# Patient Record
Sex: FEMALE | Race: BLACK OR AFRICAN AMERICAN | Employment: OTHER | URBAN - METROPOLITAN AREA
[De-identification: names, ages, dates, MRNs, and addresses within clinical notes are randomized per-mention and may not be internally consistent; named-entity substitution may affect disease eponyms.]

---

## 2017-03-22 ENCOUNTER — OFFICE VISIT (OUTPATIENT)
Dept: ORTHOPEDIC SURGERY | Age: 57
End: 2017-03-22

## 2017-03-22 VITALS
BODY MASS INDEX: 25.91 KG/M2 | TEMPERATURE: 98.7 F | OXYGEN SATURATION: 98 % | DIASTOLIC BLOOD PRESSURE: 67 MMHG | HEART RATE: 71 BPM | SYSTOLIC BLOOD PRESSURE: 136 MMHG | WEIGHT: 171 LBS | RESPIRATION RATE: 18 BRPM | HEIGHT: 68 IN

## 2017-03-22 DIAGNOSIS — M48.061 LUMBAR SPINAL STENOSIS: Primary | ICD-10-CM

## 2017-03-22 DIAGNOSIS — G35 MULTIPLE SCLEROSIS (HCC): ICD-10-CM

## 2017-03-22 RX ORDER — DULOXETIN HYDROCHLORIDE 30 MG/1
30 CAPSULE, DELAYED RELEASE ORAL
Qty: 30 CAP | Refills: 1 | Status: SHIPPED | OUTPATIENT
Start: 2017-03-22 | End: 2017-05-10

## 2017-03-22 RX ORDER — TERBINAFINE HYDROCHLORIDE 250 MG/1
250 TABLET ORAL
COMMUNITY
Start: 2016-09-19 | End: 2017-05-10

## 2017-03-22 NOTE — PROGRESS NOTES
[address]  [address], [address]  [facility], [address]  Phone: [phone]  Fax: [phone]        Magali Mayorga  : 1960  PCP: Kimberly Polk MD      NEW PATIENT      ASSESSMENT AND PLAN     Abby Crystal was seen today for back pain. Diagnoses and all orders for this visit:    Lumbar spinal stenosis    Multiple sclerosis ([address]    Other orders  -     DULoxetine (CYMBALTA) 30 mg capsule; Take 1 Cap by mouth daily (with dinner). 1. Injections, medications, and surgery discussed as possible treatment options. 2. Trial of Cymbalta. 3. Referral to Dr. Linus Garcia for surgical evaluation. 4. Given surgical information for stenosis. Follow-up Disposition:  Return for sx eval for stenosis . Eva Serrato is seen today in consultation at the request of Dr. Dileep Alonso for complaints of back pain and sciatica. HISTORY OF PRESENT ILLNESS  Sheldon Carrizales is a 61 y.o. female. Pt states that she has had her back pain before she was diagnosed with MS in . Pt denies any specific incident or injury that caused their pain. Over the years her pain started to increase and was happening more often. She has had intermittent RT sciatica but has now become constant. Pt notes that her back pain has become constant as well. Pt has difficulty with standing erect. She has consulted with a chiropractor that increased her pain. Pt moved to this area in , was living in [place], [place]. Pt notes that she will have trouble standing very long. She has just started using a walker. If she sits or lays down, her pain is relieved. Pt notes that she has been unable to stand erect for a few years now due to her pain level. She has been having back pain even before  when she moved. Pt states that she has a very short standing and walking tolerance. Her pain does not radiate past her ankle.  She is unable to go shopping with her mother. Pt wishes to be able to walk around like everyone else. Pt states that she is unable to do what she wishes due to her pain. Pt wishes to be able to walk and have her life back. No weakness in BLE. No saddle paresthesia. She has had injections in her spine, last done in 2008 with benefit. Pt has been on medications but is tired of taking routine medications for her pain. She wishes to have injections in her back. Pt aware that she may need surgery. Does not recall taking Cymbalta or Lyrica. Denies persistent fevers, chills, weight changes, neurogenic bowel or bladder symptoms. Pt denies recent ED visits or hospitalizations. PMHx of MS, has monthly infusions. Memory issues. Pain Assessment  3/22/2017   Location of Pain Back   Location Modifiers -   Severity of Pain 10   Quality of Pain Sharp;Dull;Aching   Duration of Pain Persistent   Frequency of Pain Constant   Aggravating Factors Bending;Stretching;Straightening;Exercise;Squatting;Kneeling;Standing;Walking;Stairs   Limiting Behavior Yes   Relieving Factors Rest   Result of Injury No             MRI Results (most recent):    Results from Hospital Encounter encounter on 02/17/17   MRI LUMB SPINE WO CONT   Narrative INDICATION: LUMBAGO WITH SCIATICA RIGHT SIDE back pain, right-sided sciatica,  radiculopathy    TECHNIQUE: Multiplanar, multisequence MRI lumbar spine without IV contrast    COMPARISON: 10/16/2008    FINDINGS:    Vertebrae: Grade 1 degenerative retrolisthesis at L2-L3. Grade 1 degenerative  anterolisthesis at L4-L5. Disc Spaces: No significant disc space narrowing. Conus: Terminates at L1    Soft Tissues: Unremarkable. LEVELS:    T12-L1: Unremarkable. L1-L2: Mild facet arthropathy. No stenosis. No change. L2-L3: Diffuse disc bulge. Superimposed left paracentral disc protrusion. Mild  central canal stenosis. Mild bilateral foraminal stenosis. This has slightly  progressed since previous study. L3-L4: Mild disc bulge.  Mild ligamentum flavum thickening and facet arthropathy. No stenosis. No change. L4-L5: Diffuse disc bulge with osteophytic ridging. Severe ligament flavum  thickening. Severe bilateral facet arthropathy. Severe central canal stenosis  with AP central canal diameter measuring 6-7 mm. This is slightly progressed  since previous study. Moderate bilateral foraminal stenosis. L5-S1: Diffuse disc bulge. Severe facet arthropathy. Mild ligament flavum  thickening. Moderate central canal stenosis. There is superimposed left  paracentral/foraminal disc protrusion resulting in severe left foraminal  stenosis. Moderate right foraminal stenosis. Degree of central canal and  foraminal stenosis has progressed since previous study. Impression IMPRESSION:  1. Severe central canal stenosis at L4-L5. 2.  Moderate central canal stenosis at L5-S1.    3.  Mild central canal stenosis at L2-L3. 4.  Multilevel degenerative changes as described above. PAST MEDICAL HISTORY   Past Medical History:   Diagnosis Date    Anemia     Anemia NEC     Arthritis     Asthma     Back pain     [facility] (multiple sclerosis) [facility] 6/2/2010    Multiple sclerosis ([facility] [address] [facility] 1993    Neurogenic bladder     Urge incontinence        Past Surgical History:   Procedure Laterality Date    APPENDECTOMY      BREAST SURGERY PROCEDURE UNLISTED      biopsy    DENTAL SURGERY PROCEDURE      HX HEENT      dental    HX OTHER SURGICAL      ORAL MAXILLOFACIAL SURGERY POST PROCEDURE ORDERSET 577       MEDICATIONS    Current Outpatient Prescriptions   Medication Sig Dispense Refill    terbinafine HCl (LAMISIL) 250 mg tablet 250 mg.      natalizumab (TYSABRI) 300 mg/15 mL injection Inject  in vein Every 28 Days.       terbinafine HCl (LAMISIL) 250 mg tablet 250 mg.      meclizine (ANTIVERT) 25 mg tablet TAKE 1 TABLET BY MOUTH AS NEEDED ONCE A DAY  0    oxyCODONE-acetaminophen (PERCOCET) 5-325 mg per tablet Take 2 Tabs by mouth every six (6) hours as needed. Max Daily Amount: 8 Tabs. 30 Tab 0    ondansetron (ZOFRAN ODT) 4 mg disintegrating tablet Take 1 Tab by mouth every eight (8) hours as needed for Nausea. 15 Tab 0    MULTIVITAMINS (MULTI-VITAMIN PO) Take  by mouth.  cholecalciferol, vitamin d3, (VITAMIN D) 1,000 unit tablet Take  by mouth daily.  ascorbic acid (VITAMIN C) 500 mg tablet Take  by mouth.  cyanocobalamin (VITAMIN B-12) 1,000 mcg tablet Take 1,000 mcg by mouth daily. ALLERGIES  Allergies   Allergen Reactions    Sulfa (Sulfonamide Antibiotics) Rash          SOCIAL HISTORY    Social History     Social History    Marital status: SINGLE     Spouse name: N/A    Number of children: N/A    Years of education: N/A     Occupational History    Not on file. Social History Main Topics    Smoking status: Never Smoker    Smokeless tobacco: Not on file    Alcohol use No    Drug use: No    Sexual activity: Yes     Other Topics Concern    Not on file     Social History Narrative       FAMILY HISTORY  Family History   Problem Relation Age of Onset    Hypertension Mother     High Cholesterol Mother     Elevated Lipids Mother     Hypertension Father     High Cholesterol Father     Elevated Lipids Father          REVIEW OF SYSTEMS  Review of Systems   Constitutional: Negative for chills, fever and weight loss. Respiratory: Negative for shortness of breath. Cardiovascular: Negative for chest pain. Gastrointestinal: Negative for constipation. Negative for fecal incontinence   Genitourinary: Negative for dysuria. Negative for urinary incontinence   Musculoskeletal:        Per HPI   Skin: Negative for rash. Neurological: Negative for dizziness, tingling, tremors, focal weakness and headaches. Endo/Heme/Allergies: Does not bruise/bleed easily. Psychiatric/Behavioral: The patient does not have insomnia.           PHYSICAL EXAMINATION  Visit Vitals    /67    Pulse 71    Temp 98.7 °F (37.1 °C) (Oral)    Resp 18    Ht 5' 8\" (1.727 m)    Wt 171 lb (77.6 kg)    SpO2 98%    BMI 26 kg/m2          Accompanied by family member. Constitutional:  Well developed, well nourished, in no acute distress. Psychiatric: Affect and mood are appropriate. Integumentary: No rashes or abrasions noted on exposed areas. Cardiovascular/Peripheral Vascular: Intact l pulses. No peripheral edema is noted. Lymphatic:  No evidence of lymphedema. No cervical lymphadenopathy. SPINE/MUSCULOSKELETAL EXAM    Lumbar spine:  No rash, ecchymosis, or gross obliquity. No fasciculations. No focal atrophy is noted. No pain with hip ROM. Tenderness to palpation bilaterally at L4-5. No tenderness to palpation at the sciatic notch. SI joints non-tender. Trochanters non tender. Sensation grossly intact to light touch. MOTOR:       Hip Flex  Quads Hamstrings Ankle DF EHL Ankle PF   Right +4/5 +4/5 +4/5 +4/5 +4/5 +4/5   Left +4/5 +4/5 +4/5 +4/5 +4/5 +4/5     DTRs are 2+ brisk patella, and Achilles. Straight Leg raise negative. Ambulation with walker. FWB. Written by Beverley Donovan, as dictated by Karyle Pringle, MD.    Ms. Meka Knowles may have a reminder for a \"due or due soon\" health maintenance. I have asked that she contact her primary care provider for follow-up on this health maintenance.

## 2017-03-22 NOTE — PROGRESS NOTES
Verbal order entered per Dr. Isaac Loza as documented on blue sheet: Cymbalta 30mg take 1 tab PO with dinner.  Disp 30 with 1 refill

## 2017-03-22 NOTE — PATIENT INSTRUCTIONS
Deciding About Surgery for Lumbar Spinal Stenosis  What is lumbar spinal stenosis? Lumbar spinal stenosis is the narrowing of the spinal canal in the lower back. This occurs when bone and other tissues grow inside the openings in the spinal bones. This can squeeze the nerves that branch out from the spinal cord. The squeezing can cause pain, numbness, or weakness. It happens most often in the legs, feet, or buttocks. You may choose to have surgery to ease your symptoms. Or you may try other treatments instead. These include medicines, exercise, and physical therapy. What are key points about this decision? · If your symptoms are mild to moderate, then medicine, physical therapy, and exercise may be all you need. · You may want surgery if you have tried other treatment for a while and your symptoms are still so bad that you can't do your normal activities. · Your symptoms may come back after a few years. You may need surgery again. · Surgery will most likely help leg pain. But it may not help back pain as much. Why might you choose to have surgery? · Surgery can relieve pain. It can also improve how well you can walk. · You have tried other treatment for a while and your symptoms are still so bad that you can't do your normal activities. · Without surgery, your symptoms may still bother you. If symptoms are very painful, they most often will not improve on their own. · Your doctor may advise surgery if you can't control your bladder or bowels as well as you used to. Surgery is also an option if you notice sudden changes in how well you can walk or you are more clumsy than before. Why might you choose not to have surgery? · You may try other treatments to help your symptoms. These include medicine, exercise, and physical therapy. These other treatments work best for people with mild to moderate symptoms. · Risks from surgery include nerve injury and tissue tears.  And you could have chronic pain, trouble passing urine, and a spine that is not stable. · All surgery has some risks. These include bleeding, infection, and risks from anesthesia. · You may not be able to return to all of your normal activities for many months. · Your symptoms may come back in a few years. You may need surgery again. Your decision  Thinking about the facts and your feelings can help you make a decision that is right for you. Be sure you understand the benefits and risks of your options, and think about what else you need to do before you make the decision. Where can you learn more? Go to http://rios-jt.info/. Enter Y587 in the search box to learn more about \"Deciding About Surgery for Lumbar Spinal Stenosis. \"  Current as of: May 23, 2016  Content Version: 11.1  © 4042-3833 [employer]. Care instructions adapted under license by [employer] (which disclaims liability or warranty for this information). If you have questions about a medical condition or this instruction, always ask your healthcare professional. [place] [address] any warranty or liability for your use of this information.

## 2017-03-22 NOTE — MR AVS SNAPSHOT
Visit Information Date & Time Provider Department Dept. Phone Encounter #  
 3/22/2017  2:30  [address],  [facility], [address] and [facility]ST [phone] [id number] Follow-up Instructions Return for sx eval for stenosis . Your Appointments 5/5/2017  2:00 PM  
SURGERY CONSULT with Dain Shaikh MD  
[address], [address] and [facility] ONE [address] [place] [address] Road) Appt Note: [place]  
 [address] [place] [address]  
[phone]  
  
   
 [address]  
  
    
 8/21/2017  9:15 AM  
ESTABLISHED PATIENT with Francy King MD  
[facility]) Appt Note: Return in about 6 months (around 8/16/2017) for NGB.  
 [address] [address]  
[phone]  
  
   
 [place] [id number] Upcoming Health Maintenance Date Due Pneumococcal 19-64 Medium Risk (1 of 1 - PPSV23) 3/7/1978 DTaP/Tdap/Td series (1 - Tdap) 3/7/1980 FOBT Q 1 YEAR AGE 50-75 3/7/2009 PAP AKA CERVICAL CYTOLOGY 1/28/2016 INFLUENZA AGE 9 TO ADULT 8/1/2016 BREAST CANCER SCRN MAMMOGRAM 12/27/2018 Allergies as of 3/22/2017  Review Complete On: 3/22/2017 By: Shereen Jaquez MD  
  
 Severity Noted Reaction Type Reactions Sulfa (Sulfonamide Antibiotics)  05/12/2010    Rash Current Immunizations  Reviewed on 6/28/2010 No immunizations on file. Not reviewed this visit Vitals BP Pulse Temp Resp Height(growth percentile) Weight(growth percentile) 136/67 71 98.7 °F (37.1 °C) (Oral) 18 5' 8\" (1.727 m) 171 lb (77.6 kg) SpO2 BMI OB Status Smoking Status 98% 26 kg/m2 Having regular periods Never Smoker BMI and BSA Data Body Mass Index Body Surface Area  
 26 kg/m 2 1.93 m 2 Preferred Pharmacy Pharmacy Name Phone  [id number]  [address], [address] [phone] Your Updated Medication List  
  
   
This list is accurate as of: 3/22/17  3:40 PM.  Always use your most recent med list.  
  
  
  
  
 DULoxetine 30 mg capsule Commonly known as:  CYMBALTA Take 1 Cap by mouth daily (with dinner). * LamISIL 250 mg tablet Generic drug:  terbinafine HCl  
250 mg.  
  
 * terbinafine HCl 250 mg tablet Commonly known as:  LAMISIL  
250 mg.  
  
 meclizine 25 mg tablet Commonly known as:  ANTIVERT  
TAKE 1 TABLET BY MOUTH AS NEEDED ONCE A DAY MULTI-VITAMIN PO Take  by mouth. natalizumab 300 mg/15 mL injection Commonly known as:  TYSABRI Inject  in vein Every 28 Days. ondansetron 4 mg disintegrating tablet Commonly known as:  ZOFRAN ODT Take 1 Tab by mouth every eight (8) hours as needed for Nausea. oxyCODONE-acetaminophen 5-325 mg per tablet Commonly known as:  PERCOCET Take 2 Tabs by mouth every six (6) hours as needed. Max Daily Amount: 8 Tabs. VITAMIN B-12 1,000 mcg tablet Generic drug:  cyanocobalamin Take 1,000 mcg by mouth daily. VITAMIN C 500 mg tablet Generic drug:  ascorbic acid (vitamin C) Take  by mouth. VITAMIN D3 1,000 unit tablet Generic drug:  cholecalciferol Take  by mouth daily. * Notice: This list has 2 medication(s) that are the same as other medications prescribed for you. Read the directions carefully, and ask your doctor or other care provider to review them with you. Prescriptions Sent to Pharmacy Refills DULoxetine (CYMBALTA) 30 mg capsule 1 Sig: Take 1 Cap by mouth daily (with dinner). Class: Normal  
 Pharmacy: [facility] Ph #: [phone] Route: Oral  
  
Follow-up Instructions Return for sx eval for stenosis . Patient Instructions Deciding About Surgery for Lumbar Spinal Stenosis What is lumbar spinal stenosis? Lumbar spinal stenosis is the narrowing of the spinal canal in the lower back. This occurs when bone and other tissues grow inside the openings in the spinal bones. This can squeeze the nerves that branch out from the spinal cord. The squeezing can cause pain, numbness, or weakness. It happens most often in the legs, feet, or buttocks. You may choose to have surgery to ease your symptoms. Or you may try other treatments instead. These include medicines, exercise, and physical therapy. What are key points about this decision? · If your symptoms are mild to moderate, then medicine, physical therapy, and exercise may be all you need. · You may want surgery if you have tried other treatment for a while and your symptoms are still so bad that you can't do your normal activities. · Your symptoms may come back after a few years. You may need surgery again. · Surgery will most likely help leg pain. But it may not help back pain as much. Why might you choose to have surgery? · Surgery can relieve pain. It can also improve how well you can walk. · You have tried other treatment for a while and your symptoms are still so bad that you can't do your normal activities. · Without surgery, your symptoms may still bother you. If symptoms are very painful, they most often will not improve on their own. · Your doctor may advise surgery if you can't control your bladder or bowels as well as you used to. Surgery is also an option if you notice sudden changes in how well you can walk or you are more clumsy than before. Why might you choose not to have surgery? · You may try other treatments to help your symptoms. These include medicine, exercise, and physical therapy. These other treatments work best for people with mild to moderate symptoms. · Risks from surgery include nerve injury and tissue tears. And you could have chronic pain, trouble passing urine, and a spine that is not stable. · All surgery has some risks. These include bleeding, infection, and risks from anesthesia. · You may not be able to return to all of your normal activities for many months. · Your symptoms may come back in a few years. You may need surgery again. Your decision Thinking about the facts and your feelings can help you make a decision that is right for you. Be sure you understand the benefits and risks of your options, and think about what else you need to do before you make the decision. Where can you learn more? Go to http://rios-jt.info/. Enter Y828 in the search box to learn more about \"Deciding About Surgery for Lumbar Spinal Stenosis. \" Current as of: May 23, 2016 Content Version: 11.1 © 6781-6907 [employer]. Care instructions adapted under license by "[employer] (which disclaims liability or warranty for this information). If you have questions about a medical condition or this instruction, always ask your healthcare professional. [place] [address] any warranty or liability for your use of this information. Introducing [facility]! [facility] introduces [employer] patient portal. Now you can access parts of your medical record, email your doctor's office, and request medication refills online. 1. In your internet browser, go to [email or web address] 2. Click on the First Time User? Click Here link in the Sign In box. You will see the New Member Sign Up page. 3. Enter your [employer] Access Code exactly as it appears below. You will not need to use this code after youve completed the sign-up process. If you do not sign up before the expiration date, you must request a new code. · [employer] Access Code: [email or web address] Expires: 5/28/2017  1:08 PM 
 
4. Enter the last four digits of your Social Security Number (xxxx) and Date of Birth (mm/dd/yyyy) as indicated and click Submit.  You will be taken to the next sign-up page. 5. Create a Ventrix ID. This will be your [employer] login ID and cannot be changed, so think of one that is secure and easy to remember. 6. Create a [employer] password. You can change your password at any time. 7. Enter your Password Reset Question and Answer. This can be used at a later time if you forget your password. 8. Enter your e-mail address. You will receive e-mail notification when new information is available in [address] 9. Click Sign Up. You can now view and download portions of your medical record. 10. Click the Download Summary menu link to download a portable copy of your medical information. If you have questions, please visit the Frequently Asked Questions section of the [employer] website. Remember, [employer] is NOT to be used for urgent needs. For medical emergencies, dial 911. Now available from your iPhone and Android! Please provide this summary of care documentation to your next provider. Your primary care clinician is listed as [address] If you have any questions after today's visit, please call [phone].

## 2017-05-19 ENCOUNTER — OFFICE VISIT (OUTPATIENT)
Dept: CARDIOLOGY CLINIC | Age: 57
End: 2017-05-19

## 2017-05-19 VITALS
DIASTOLIC BLOOD PRESSURE: 67 MMHG | SYSTOLIC BLOOD PRESSURE: 125 MMHG | BODY MASS INDEX: 25.76 KG/M2 | HEIGHT: 68 IN | HEART RATE: 59 BPM | WEIGHT: 170 LBS

## 2017-05-19 DIAGNOSIS — R94.31 ABNORMAL EKG: Primary | ICD-10-CM

## 2017-05-19 DIAGNOSIS — Z01.810 PRE-OPERATIVE CARDIOVASCULAR EXAMINATION: ICD-10-CM

## 2017-05-19 NOTE — LETTER
Coco Caro 1960 5/19/2017 Dear MD Tata Lucia MD 
 
I had the pleasure of evaluating  Ms. Haji in office today. Below are the relevant portions of my assessment and plan of care. ICD-10-CM ICD-9-CM 1. Abnormal EKG R94.31 794.31   
 1 st degree av block 
old 
also seen in 2010 
otherwise wnl 2. Pre-operative cardiovascular examination Z01.810 V72.81   
 back surgery 
ok to proceed  
low risk Current Outpatient Prescriptions Medication Sig Dispense Refill  natalizumab (TYSABRI) 300 mg/15 mL injection Inject  in vein Every 28 Days.  meclizine (ANTIVERT) 25 mg tablet TAKE 1 TABLET BY MOUTH AS NEEDED ONCE A DAY  0  
 MULTIVITAMINS (MULTI-VITAMIN PO) Take  by mouth.  cholecalciferol, vitamin d3, (VITAMIN D) 1,000 unit tablet Take  by mouth daily.  ascorbic acid (VITAMIN C) 500 mg tablet Take  by mouth.  cyanocobalamin (VITAMIN B-12) 1,000 mcg tablet Take 1,000 mcg by mouth daily. No orders of the defined types were placed in this encounter. If you have questions, please do not hesitate to call me. I look forward to following Ms. Haji along with you. Sincerely, Modesto Jackson MD

## 2017-05-19 NOTE — LETTER
2017 Dr Jazmín Murdock M.D. Dear Avtar Navarro Re: Chiquita Michele : 1960 Ms. Megan Jason is cleared from a cardiac standpoint with mild risk for lumbar surgery scheduled in . If you have any questions or any further assistance is needed please contact our office. Sincerely, Germán Banuelos MD 
  
cc:  Kasie Wise MD

## 2017-05-19 NOTE — MR AVS SNAPSHOT
Visit Information Date & Time Provider Department Dept. Phone Encounter #  
 5/19/2017  9:30 AM Boy Paul MD [facility] [id number] Follow-up Instructions Return in about 3 months (around 8/19/2017) for low risk, Cleared for planned surgery. Your Appointments 5/19/2017  2:00 PM  
SURGERY CONSULT with Cas Mei MD  
[address] [facility], [address] [facility]) Appt Note: SC; SC WILL BRING DISC  
 [address] [place] [address] [phone]  
  
   
 [address] [place] [address] 8/11/2017 10:30 AM  
ESTABLISHED PATIENT with Boy Paul MD  
[facility]) Appt Note: 3 months [address] [facility], [address] [place] [address]  
[facility], [address] [place] [address] Road [address]  
  
    
 8/21/2017  9:15 AM  
ESTABLISHED PATIENT with Kati Lamar MD  
[facility] [place] [address] [place] [address]) Appt Note: Return in about 6 months (around 8/16/2017) for NGB.  
 [address] [address]  
[phone]  
  
   
 [address] [place] [address] Upcoming Health Maintenance Date Due Pneumococcal 19-64 Medium Risk (1 of 1 - PPSV23) 3/7/1978 DTaP/Tdap/Td series (1 - Tdap) 3/7/1980 FOBT Q 1 YEAR AGE 50-75 3/7/2009 PAP AKA CERVICAL CYTOLOGY 1/28/2016 INFLUENZA AGE 9 TO ADULT 8/1/2017 BREAST CANCER SCRN MAMMOGRAM 12/27/2018 Allergies as of 5/19/2017  Review Complete On: 5/19/2017 By: Boy Paul MD  
  
 Severity Noted Reaction Type Reactions Other Plant, Animal, Environmental  05/10/2017    Sneezing Pollen causes sneezing and runny nose Sulfa (Sulfonamide Antibiotics)  05/12/2010    Rash Current Immunizations  Reviewed on 6/28/2010 No immunizations on file. Not reviewed this visit You Were Diagnosed With   
  
 Codes Comments Abnormal EKG    -  Primary ICD-10-CM: R94.31 
ICD-9-CM: 794.31 1 st degree av block 
old 
also seen in 2010 
otherwise wnl Pre-operative cardiovascular examination     ICD-10-CM: Z01.810 ICD-9-CM: V72.81 back surgery 
ok to proceed  
low risk Vitals BP Pulse Height(growth percentile) Weight(growth percentile) LMP BMI  
 125/67 (!) 59 5' 8\" (1.727 m) 170 lb (77.1 kg) 07/07/2013 25.85 kg/m2 OB Status Smoking Status Postmenopausal Never Smoker Vitals History BMI and BSA Data Body Mass Index Body Surface Area  
 25.85 kg/m 2 1.92 m 2 Preferred Pharmacy Pharmacy Name Phone [id number], [address] [place] [address] Ave [phone] Your Updated Medication List  
  
   
This list is accurate as of: 5/19/17  9:42 AM.  Always use your most recent med list.  
  
  
  
  
 meclizine 25 mg tablet Commonly known as:  ANTIVERT  
TAKE 1 TABLET BY MOUTH AS NEEDED ONCE A DAY MULTI-VITAMIN PO Take  by mouth. natalizumab 300 mg/15 mL injection Commonly known as:  TYSABRI Inject  in vein Every 28 Days. VITAMIN B-12 1,000 mcg tablet Generic drug:  cyanocobalamin Take 1,000 mcg by mouth daily. VITAMIN C 500 mg tablet Generic drug:  ascorbic acid (vitamin C) Take  by mouth. VITAMIN D3 1,000 unit tablet Generic drug:  cholecalciferol Take  by mouth daily. Follow-up Instructions Return in about 3 months (around 8/19/2017) for low risk, Cleared for planned surgery. To-Do List   
 06/02/2017 9:00 AM  
  Appointment with [address] at [address] [place] [address] ([phone]) Introducing [facility]! Fidelina Menchaca introduces [employer] patient portal. Now you can access parts of your medical record, email your doctor's office, and request medication refills online.    
 
1. In your internet browser, go to [email or web address] 2. Click on the First Time User? Click Here link in the Sign In box. You will see the New Member Sign Up page. 3. Enter your [employer] Access Code exactly as it appears below. You will not need to use this code after youve completed the sign-up process. If you do not sign up before the expiration date, you must request a new code. · [employer] Access Code: [email or web address] Expires: 5/28/2017  1:08 PM 
 
4. Enter the last four digits of your Social Security Number (xxxx) and Date of Birth (mm/dd/yyyy) as indicated and click Submit. You will be taken to the next sign-up page. 5. Create a [employer] ID. This will be your [employer] login ID and cannot be changed, so think of one that is secure and easy to remember. 6. Create a [employer] password. You can change your password at any time. 7. Enter your Password Reset Question and Answer. This can be used at a later time if you forget your password. 8. Enter your e-mail address. You will receive e-mail notification when new information is available in [address] 9. Click Sign Up. You can now view and download portions of your medical record. 10. Click the Download Summary menu link to download a portable copy of your medical information. If you have questions, please visit the Frequently Asked Questions section of the [employer] website. Remember, [employer] is NOT to be used for urgent needs. For medical emergencies, dial 911. Now available from your iPhone and Android! Please provide this summary of care documentation to your next provider. Your primary care clinician is listed as [address] If you have any questions after today's visit, please call [phone].

## 2017-05-19 NOTE — PROGRESS NOTES
HISTORY OF PRESENT ILLNESS  Black Al is a 61 y.o. female. HPI Comments: Patient with abn ekg here for pre op assessment  On follow up patient denies any chest pains,sob, palpitation or other significant symptoms. New Patient   The history is provided by the patient. Pertinent negatives include no chest pain, no abdominal pain, no headaches and no shortness of breath. Pre-op Exam   The history is provided by the patient. This is a new problem. Pertinent negatives include no chest pain, no abdominal pain, no headaches and no shortness of breath. Review of Systems   Constitutional: Negative for chills and fever. HENT: Negative for nosebleeds. Eyes: Negative for blurred vision and double vision. Respiratory: Negative for cough, hemoptysis, sputum production, shortness of breath and wheezing. Cardiovascular: Negative for chest pain, palpitations, orthopnea, claudication, leg swelling and PND. Gastrointestinal: Negative for abdominal pain, heartburn, nausea and vomiting. Musculoskeletal: Positive for back pain. Negative for myalgias. Skin: Negative for rash. Neurological: Negative for dizziness, weakness and headaches. Endo/Heme/Allergies: Does not bruise/bleed easily.      Family History   Problem Relation Age of Onset    Hypertension Mother     High Cholesterol Mother     Elevated Lipids Mother     Hypertension Father     High Cholesterol Father     Elevated Lipids Father        Past Medical History:   Diagnosis Date    Anemia     Anemia NEC     Arthritis     Asthma     Back pain     MS (multiple sclerosis) ([address] 6/2/2010    Multiple sclerosis ([address] 1993    Neurogenic bladder     Urge incontinence        Past Surgical History:   Procedure Laterality Date    APPENDECTOMY  10/2016    BREAST SURGERY PROCEDURE UNLISTED      biopsy    DENTAL SURGERY PROCEDURE      HX HEENT      dental    HX OTHER SURGICAL      ORAL MAXILLOFACIAL SURGERY POST PROCEDURE ORDERSET 577       Social History   Substance Use Topics    Smoking status: Never Smoker    Smokeless tobacco: Never Used    Alcohol use No       Allergies   Allergen Reactions    Other Plant, Animal, Environmental Sneezing     Pollen causes sneezing and runny nose    Sulfa (Sulfonamide Antibiotics) Rash       Prior to Admission medications    Medication Sig Start Date End Date Taking? Authorizing Provider   natalizumab (TYSABRI) 300 mg/15 mL injection Inject  in vein Every 28 Days. Yes Historical Provider   meclizine (ANTIVERT) 25 mg tablet TAKE 1 TABLET BY MOUTH AS NEEDED ONCE A DAY 2/1/17  Yes Historical Provider   MULTIVITAMINS (MULTI-VITAMIN PO) Take  by mouth. Yes Historical Provider   cholecalciferol, vitamin d3, (VITAMIN D) 1,000 unit tablet Take  by mouth daily. Yes Historical Provider   ascorbic acid (VITAMIN C) 500 mg tablet Take  by mouth. Yes Historical Provider   cyanocobalamin (VITAMIN B-12) 1,000 mcg tablet Take 1,000 mcg by mouth daily. Yes Historical Provider         Visit Vitals    /67    Pulse (!) 59    Ht 5' 8\" (1.727 m)    Wt 77.1 kg (170 lb)    LMP 07/07/2013    BMI 25.85 kg/m2         Physical Exam   Constitutional: She is oriented to person, place, and time. She appears well-developed and well-nourished. HENT:   Head: Normocephalic and atraumatic. Eyes: Conjunctivae are normal.   Neck: Neck supple. No JVD present. No tracheal deviation present. No thyromegaly present. Cardiovascular: Normal rate and regular rhythm. PMI is not displaced. Exam reveals no gallop, no S3 and no decreased pulses. No murmur heard. Pulmonary/Chest: No respiratory distress. She has no wheezes. She has no rales. She exhibits no tenderness. Abdominal: Soft. There is no tenderness. Musculoskeletal: She exhibits no edema. Neurological: She is alert and oriented to person, place, and time. Skin: Skin is warm. Psychiatric: She has a normal mood and affect.        Ms. Shanel Smith has a reminder for a \"due or due soon\" health maintenance. I have asked that she contact her primary care provider for follow-up on this health maintenance. I have personally reviewed patients ekg done at other facility. Sr,1 st degree av block-5/2017    Assessment         ICD-10-CM ICD-9-CM    1. Abnormal EKG R94.31 794.31     1 st degree av block  old  also seen in 2010  otherwise wnl   2. Pre-operative cardiovascular examination Z01.810 V72.81     back surgery  ok to proceed   low risk       There are no discontinued medications. No orders of the defined types were placed in this encounter. Follow-up Disposition:  Return in about 3 months (around 8/19/2017) for low risk, Cleared for planned surgery.

## 2017-06-16 PROBLEM — M48.061 LUMBAR STENOSIS: Status: ACTIVE | Noted: 2017-06-16

## 2017-12-12 ENCOUNTER — APPOINTMENT (OUTPATIENT)
Dept: PHYSICAL THERAPY | Age: 57
End: 2017-12-12

## 2017-12-18 ENCOUNTER — APPOINTMENT (OUTPATIENT)
Dept: PHYSICAL THERAPY | Age: 57
End: 2017-12-18

## 2020-10-23 ENCOUNTER — IMPORTED ENCOUNTER (OUTPATIENT)
Dept: URBAN - METROPOLITAN AREA CLINIC 1 | Facility: CLINIC | Age: 60
End: 2020-10-23

## 2020-10-23 PROBLEM — H16.001: Noted: 2020-10-23

## 2020-10-23 PROCEDURE — 92002 INTRM OPH EXAM NEW PATIENT: CPT

## 2020-10-23 NOTE — PATIENT DISCUSSION
1.  Corneal ulcer OD -- Begin Besivance QID OD (Erx'd). No CTL wear discussed healthy CTL wear and only wearing lenses  for FDA approved amount of time. Return immediately if ulcer larger or symptoms worsen. No contact lens use. 2. Dry Eyes OU -- Recommended ATs Q4Hrs OD secondary to ulcer BID-QID OS. 3.  Epiphora OD4. Cataracts OUReturn for an appointment in 1 week 10 with Dr. Mica Bullard.

## 2020-10-28 ENCOUNTER — IMPORTED ENCOUNTER (OUTPATIENT)
Dept: URBAN - METROPOLITAN AREA CLINIC 1 | Facility: CLINIC | Age: 60
End: 2020-10-28

## 2020-10-28 PROBLEM — H17.821: Noted: 2020-10-28

## 2020-10-28 PROCEDURE — 92012 INTRM OPH EXAM EST PATIENT: CPT

## 2020-10-28 NOTE — PATIENT DISCUSSION
Peripheral Corneal Scar OD - Continue Ocuflox TID OD for a week. No CTL wear discussed healthy CTL wear and only wearing lenses  for FDA approved amount of time. Return immediately if ulcer larger or symptoms worsen. No contact lens use. Pt can go back to CTLs once finished with antibiotic. 2.  Dry Eyes OU -- Recommended AT's TID OD secondary to ulcer BID-QID OS. 3.  Epiphora OD4. Cataracts OUReturn for an appointment in 2 weeks 30/cc with Dr. Antonio Quezada.

## 2020-11-10 ENCOUNTER — IMPORTED ENCOUNTER (OUTPATIENT)
Dept: URBAN - METROPOLITAN AREA CLINIC 1 | Facility: CLINIC | Age: 60
End: 2020-11-10

## 2020-11-10 PROBLEM — H25.813: Noted: 2020-11-10

## 2020-11-10 PROBLEM — H17.821: Noted: 2020-11-10

## 2020-11-10 PROBLEM — H04.123: Noted: 2020-11-10

## 2020-11-10 PROCEDURE — 92015 DETERMINE REFRACTIVE STATE: CPT

## 2020-11-10 PROCEDURE — 92014 COMPRE OPH EXAM EST PT 1/>: CPT

## 2020-11-10 NOTE — PATIENT DISCUSSION
Peripheral Corneal Scar OD -- D/c Ocuflox TID OD. MRx for glasses given to patient. CTL Trials given to patient today (). Discussed healthy CTL wear time and only weaing CTLs for FDA approved amount of time. Return for an appointment in with Dr. Denisha Meredith.

## 2020-11-10 NOTE — PATIENT DISCUSSION
1.  Cataract OU -- Observe for now without intervention. The patient was advised to contact us if any change or worsening of vision2. Dry Eyes OU -- Cont the recommended use of ATs BID OU routinely. 3.  Peripheral Corneal Scar OD -- D/c Ocuflox TID OD. CTL Trials given to patient today (Air [employer] night & day). Discussed healthy CTL wear time and only weaing CTLs for FDA approved amount of time. MRx for glasses given to patient. Return for an appointment in 1 week cc with Dr. Sandy Turner.

## 2020-11-19 ENCOUNTER — IMPORTED ENCOUNTER (OUTPATIENT)
Dept: URBAN - METROPOLITAN AREA CLINIC 1 | Facility: CLINIC | Age: 60
End: 2020-11-19

## 2020-11-19 NOTE — PATIENT DISCUSSION
1. CC Today -- Finalized CTL Rx today and given to patient today. Good comfort fit and vision. Return for an appointment in 1 year 30/cc with Dr. David Mosqueda.

## 2021-11-17 ENCOUNTER — IMPORTED ENCOUNTER (OUTPATIENT)
Dept: URBAN - METROPOLITAN AREA CLINIC 1 | Facility: CLINIC | Age: 61
End: 2021-11-17

## 2021-11-17 PROBLEM — H04.123: Noted: 2021-11-17

## 2021-11-17 PROBLEM — H17.821: Noted: 2021-11-17

## 2021-11-17 PROBLEM — H25.813: Noted: 2021-11-17

## 2021-11-17 PROCEDURE — 92015 DETERMINE REFRACTIVE STATE: CPT

## 2021-11-17 PROCEDURE — 99214 OFFICE O/P EST MOD 30 MIN: CPT

## 2021-11-17 NOTE — PATIENT DISCUSSION
1.  Cataract OU - Observe for now without intervention. The patient was advised to contact us if any change or worsening of vision. 2. Dry Eyes OU - Cont the recommended use of ATs BID OU routinely. 3.  Peripheral Corneal Scar OD - stable. MRX given for glasses and CTL. Return for an appointment in 1 year for 30/cc with Dr. Nico Thomas. Return for an appointment in 6 months for DFE with Dr. Nico Thomas.

## 2022-03-20 PROBLEM — M48.061 LUMBAR STENOSIS: Status: ACTIVE | Noted: 2017-06-16

## 2022-04-02 ASSESSMENT — KERATOMETRY
OD_K2POWER_DIOPTERS: 44.50
OD_K1POWER_DIOPTERS: 45.75
OS_K1POWER_DIOPTERS: 45.75
OS_K2POWER_DIOPTERS: 44.00
OD_AXISANGLE_DEGREES: 089
OS_AXISANGLE_DEGREES: 104
OD_AXISANGLE2_DEGREES: 179
OS_AXISANGLE2_DEGREES: 014

## 2022-04-02 ASSESSMENT — VISUAL ACUITY
OS_CC: J1
OD_CC: J1
OS_CC: J1
OD_CC: J1
OD_SC: 20/25
OS_SC: 20/25
OD_GLARE: 20/60
OU_CC: 20/30
OD_SC: 20/20-2
OU_SC: 20/25
OD_SC: 20/25
OS_SC: 20/40
OS_GLARE: 20/60
OD_SC: 20/20
OD_SC: 20/25
OS_SC: 20/25-2
OS_SC: 20/25
OS_SC: 20/30

## 2022-04-02 ASSESSMENT — TONOMETRY
OS_IOP_MMHG: 16
OD_IOP_MMHG: 15
OD_IOP_MMHG: 16
OS_IOP_MMHG: 16
OD_IOP_MMHG: 16